# Patient Record
Sex: MALE | Race: WHITE | Employment: FULL TIME | URBAN - NONMETROPOLITAN AREA
[De-identification: names, ages, dates, MRNs, and addresses within clinical notes are randomized per-mention and may not be internally consistent; named-entity substitution may affect disease eponyms.]

---

## 2022-01-22 ENCOUNTER — HOSPITAL ENCOUNTER (EMERGENCY)
Age: 47
Discharge: HOME | End: 2022-01-22
Attending: EMERGENCY MEDICINE
Payer: COMMERCIAL

## 2022-01-22 ENCOUNTER — APPOINTMENT (OUTPATIENT)
Dept: GENERAL RADIOLOGY | Age: 47
End: 2022-01-22
Payer: COMMERCIAL

## 2022-01-22 VITALS
HEART RATE: 66 BPM | RESPIRATION RATE: 14 BRPM | BODY MASS INDEX: 28.63 KG/M2 | TEMPERATURE: 98.6 F | DIASTOLIC BLOOD PRESSURE: 92 MMHG | HEIGHT: 70 IN | SYSTOLIC BLOOD PRESSURE: 139 MMHG | WEIGHT: 200 LBS | OXYGEN SATURATION: 99 %

## 2022-01-22 DIAGNOSIS — F41.1 ANXIETY STATE: ICD-10-CM

## 2022-01-22 DIAGNOSIS — R11.2 NAUSEA AND VOMITING, INTRACTABILITY OF VOMITING NOT SPECIFIED, UNSPECIFIED VOMITING TYPE: Primary | ICD-10-CM

## 2022-01-22 PROCEDURE — 6370000000 HC RX 637 (ALT 250 FOR IP): Performed by: EMERGENCY MEDICINE

## 2022-01-22 PROCEDURE — 99285 EMERGENCY DEPT VISIT HI MDM: CPT

## 2022-01-22 PROCEDURE — 71046 X-RAY EXAM CHEST 2 VIEWS: CPT

## 2022-01-22 RX ORDER — METOCLOPRAMIDE 10 MG/1
10 TABLET ORAL ONCE
Status: COMPLETED | OUTPATIENT
Start: 2022-01-22 | End: 2022-01-22

## 2022-01-22 RX ORDER — FLUTICASONE PROPIONATE 50 MCG
2 SPRAY, SUSPENSION (ML) NASAL DAILY
Qty: 16 G | Refills: 0 | Status: SHIPPED | OUTPATIENT
Start: 2022-01-22

## 2022-01-22 RX ORDER — METOCLOPRAMIDE HYDROCHLORIDE 5 MG/5ML
10 SOLUTION ORAL
Qty: 280 ML | Refills: 0 | Status: SHIPPED | OUTPATIENT
Start: 2022-01-22 | End: 2022-01-29

## 2022-01-22 RX ORDER — LISINOPRIL 10 MG/1
TABLET ORAL
COMMUNITY
Start: 2022-01-01

## 2022-01-22 RX ORDER — HYDROXYZINE PAMOATE 25 MG/1
25 CAPSULE ORAL ONCE
Status: COMPLETED | OUTPATIENT
Start: 2022-01-22 | End: 2022-01-22

## 2022-01-22 RX ORDER — HYDROXYZINE HCL 10 MG/5 ML
10 SOLUTION, ORAL ORAL 3 TIMES DAILY PRN
Qty: 75 ML | Refills: 0 | Status: SHIPPED | OUTPATIENT
Start: 2022-01-22 | End: 2022-01-27

## 2022-01-22 RX ORDER — PANTOPRAZOLE SODIUM 40 MG/1
40 TABLET, DELAYED RELEASE ORAL ONCE
Status: DISCONTINUED | OUTPATIENT
Start: 2022-01-22 | End: 2022-01-22 | Stop reason: HOSPADM

## 2022-01-22 RX ORDER — NALTREXONE 380 MG
KIT INTRAMUSCULAR
COMMUNITY
Start: 2021-12-15

## 2022-01-22 RX ORDER — ATORVASTATIN CALCIUM 20 MG/1
TABLET, FILM COATED ORAL
COMMUNITY
Start: 2022-01-07

## 2022-01-22 RX ADMIN — HYDROXYZINE PAMOATE 25 MG: 25 CAPSULE ORAL at 13:05

## 2022-01-22 RX ADMIN — METOCLOPRAMIDE 10 MG: 10 TABLET ORAL at 13:05

## 2022-01-22 ASSESSMENT — ENCOUNTER SYMPTOMS
ABDOMINAL PAIN: 0
SHORTNESS OF BREATH: 0
BLOOD IN STOOL: 0
VOMITING: 1
CONSTIPATION: 0
COLOR CHANGE: 0

## 2022-01-22 NOTE — ED TRIAGE NOTES
Pt reports last drink was Wednesday 1/12/2022, pt reports going to [facility] for detox on Tuesday 1/25/2022.

## 2022-01-22 NOTE — ED PROVIDER NOTES
Emergency Department Provider Note  Location: [facility]ending [facility] to [facility] EMERGENCY DEPARTMENT  1/22/2022     Patient Identification  Richi Walker is a 40 y.o. male    Chief Complaint  Other (Has difficulty swallowing solid food x 4 days, states he is 9 days positive with COVID.)      Mode of Arrival  private car    HPI  (History provided by patient)  This is a 40 y.o. male with a PMH significant for HTN, HLD, alcoholism presented today for difficulty swallowing food. Patient said had URI symptoms since last Wednesday and he was diagnosed with COVID last Friday. 4 days ago, he started having trouble swallowing such that whenever he thinks of food, he starts to feel really anxious. Then when he tries to eat, he can keep down the first few bites but then after that, he would start vomiting up the food. He reports normal BM, including this morning. Denies bloody stool. He denies prior history of GERD or esophageal stricture. ROS  Review of Systems   Constitutional: Negative for chills and fever. HENT: Positive for congestion. Respiratory: Negative for shortness of breath. Cardiovascular: Negative for chest pain. Gastrointestinal: Positive for vomiting. Negative for abdominal pain, blood in stool and constipation. Genitourinary: Negative for difficulty urinating. Skin: Negative for color change. Psychiatric/Behavioral: The patient is nervous/anxious. I have reviewed the following nursing documentation:  Allergies: No Known Allergies    Past medical history:  has a past medical history of Alcoholism ([address] Hyperlipidemia, and Hypertension. Past surgical history:  has a past surgical history that includes Inguinal hernia repair (Bilateral) and Appendectomy. Home medications:   Prior to Admission medications    Medication Sig Start Date End Date Taking?  Authorizing Provider   lisinopril (PRINIVIL;ZESTRIL) 10 MG tablet  1/1/22  Yes Historical Provider, MD   atorvastatin (LIPITOR) 20 MG tablet  1/7/22  Yes Historical Provider, MD   ibuprofen (ADVIL;MOTRIN) 800 MG tablet Take 1 tablet by mouth every 8 hours as needed for Pain 2/19/18  Yes Kevin Almaguer PA-C   VIVITROL 380 MG injection  12/15/21   Historical Provider, MD       Social history:  reports that he has been smoking cigarettes. His smokeless tobacco use includes chew. He reports current alcohol use of about 49.0 standard drinks of alcohol per week. He reports that he does not use drugs. Family history:    Family History   Problem Relation Age of Onset    Asthma Mother        Exam  ED Triage Vitals [01/22/22 1146]   BP Temp Temp Source Pulse Resp SpO2 Height Weight   (!) 155/103 98.6 °F (37 °C) Oral 70 16 98 % 5' 10\" (1.778 m) 200 lb (90.7 kg)   Physical Exam  Vitals and nursing note reviewed. Constitutional:       General: He is not in acute distress. Appearance: He is well-developed. He is not diaphoretic. Comments: Patient sounds congested but in no acute distress. No cough observed. HENT:      Head: Normocephalic and atraumatic. Eyes:      General: No scleral icterus. Right eye: No discharge. Left eye: No discharge. Conjunctiva/sclera: Conjunctivae normal.   Neck:      Trachea: No tracheal deviation. Cardiovascular:      Rate and Rhythm: Normal rate and regular rhythm. Heart sounds: Normal heart sounds. No murmur heard. Pulmonary:      Effort: Pulmonary effort is normal. No respiratory distress. Breath sounds: Normal breath sounds. No stridor. No wheezing. Abdominal:      General: There is no distension. Palpations: Abdomen is soft. Tenderness: There is no abdominal tenderness. There is no guarding or rebound. Musculoskeletal:         General: No deformity. Cervical back: Neck supple. Right lower leg: No edema. Left lower leg: No edema. Skin:     General: Skin is warm and dry. Findings: No erythema or rash.    Neurological:      Mental Status: He is alert and oriented to person, place, and time. Cranial Nerves: No dysarthria or facial asymmetry. Motor: No weakness or tremor. Coordination: Coordination normal.   Psychiatric:         Mood and Affect: Mood is anxious. Behavior: Behavior normal.             MDM/ED Course  ED Medication Orders (From admission, onward)    Start Ordered     Status Ordering Provider    01/22/22 1315 01/22/22 1254  metoclopramide (REGLAN) tablet 10 mg  ONCE         Last MAR action: Given - by Pranav Maguire on 01/22/22 at [address] [place] [address]    01/22/22 1315 01/22/22 1254  pantoprazole (PROTONIX) tablet 40 mg  ONCE         Last MAR action: Not Given - by Pranav Maguire on 01/22/22 at [facility]    01/22/22 1315 01/22/22 1255  hydrOXYzine (VISTARIL) capsule 25 mg  ONCE         Last MAR action: Given - by Pranav Maguire on 01/22/22 at [address] [place] [address]         Radiology  XR CHEST (2 VW)    Result Date: 1/22/2022  EXAMINATION: TWO XRAY VIEWS OF THE CHEST 1/22/2022 12:57 pm COMPARISON: None available. HISTORY: ORDERING SYSTEM PROVIDED HISTORY: cough, difficulty swallowing TECHNOLOGIST PROVIDED HISTORY: Reason for exam:->cough, difficulty swallowing Reason for Exam: trouble swallowing, covid+ 1/14/22 FINDINGS: The lungs are clear. The cardiac silhouette is within normal limits. There is no pneumothorax or pleural effusion. 1.  No acute abnormality.          - Patient seen and evaluated in room 8.  40 y.o. male presented for anxiety whenever he eats and then starts to vomit after a few bites. This has been ongoing for 4 days. Patient did not volunteer this information but I later learned from his nurse that patient has been in an alcohol detox program and last drink was 1/12/22. Clinically, I have no concern for alcohol withdrawal at this time. He is not tachycardic or tremulous. Physical exam unremarkable.   - Patient was placed on telemetry during his/her ED stay and no malignant dysrhythmia observed. - Patient was given Atarax and Reglan in the ED. Upon reassessment, patient reported feeling slightly better. He tolerated p.o. challenge in the ED.    - Diagnostic studies reviewed. Chest x-ray did not show acute abnormality.  - I discussed the results with patient. Patient would really like something for anxiety so we agreed to Atarax. Discussed using Flonase as a nasal decongestant and the Reglan for his vomiting. He states he will also prefer liquid medication.  - Return precautions also discussed. patient verbalized understanding of care plan and agreed to follow-up with PCP as advised. I estimate there is LOW risk for ACUTE CORONARY SYNDROME, SEVERE COPD/ASTHMA EXACERBATION WITH HYPOXIA, ACUTE EXACERBATION OF CONGESTIVE HEART FAILURE, PERICARDIAL TAMPONADE, PNEUMONIA WITH HYPOXIA, PNEUMOTHORAX, PULMONARY EMBOLISM, SEPSIS, and THORACIC DISSECTION,  thus I consider the discharge disposition reasonable. Mallika Calderon and I have discussed the diagnosis and risks, and we agree with discharging home to follow-up with their PCP. We also discussed returning to the Emergency Department immediately if new or worsening symptoms occur. We have discussed the symptoms which are most concerning (e.g., bloody sputum, fever, worsening pain or shortness of breath, vomiting) that necessitate immediate return. Clinical Impression:  1. Nausea and vomiting, intractability of vomiting not specified, unspecified vomiting type    2. Anxiety state          Disposition:  Discharge to home in good condition. Blood pressure (!) 139/92, pulse 66, temperature 98.6 °F (37 °C), temperature source Oral, resp. rate 14, height 5' 10\" (1.778 m), weight 200 lb (90.7 kg), SpO2 99 %. Patient was given scripts for the following medications. I counseled patient how to take these medications.    Discharge Medication List as of 1/22/2022  2:27 PM      START taking these medications    Details   hydrOXYzine (ATARAX) 10 MG/5ML syrup Take 5 mLs by mouth 3 times daily as needed for Anxiety, Disp-75 mL, R-0Print      metoclopramide (REGLAN) 5 MG/5ML solution Take 10 mLs by mouth 4 times daily (before meals and nightly) for 7 days, Disp-280 mL, R-0Print      fluticasone (FLONASE) 50 MCG/ACT nasal spray 2 sprays by Each Nostril route daily, Disp-16 g, R-0Print             Disposition referral (if applicable):  Kar Biggs PA-C  [address] [facility]  [phone]    Schedule an appointment as soon as possible for a visit in 3 days          This chart was generated in part by using Dragon Dictation system and may contain errors related to that system including errors in grammar, punctuation, and spelling, as well as words and phrases that may be inappropriate. If there are any questions or concerns please feel free to contact the dictating provider for clarification.      Eryn Trevino MD  [facility] Oley Dakin, MD  01/22/22 0142

## 2022-01-22 NOTE — ED TRIAGE NOTES
Pt reports since Tuesday has been having trouble swallowing whole foods but ok taking liquids. Denies this happening in the past. States he feels he has to constantly chew his foods to get them down. Denies feeling like \"his throat is closing up\". Feels anxious and feels he is having a panic attack\" but denies dx. Pt reports taking mucinex on Tuesday and Wednesday and lisinopril for the last three months, denies any new medications.

## 2022-02-06 ENCOUNTER — HOSPITAL ENCOUNTER (OUTPATIENT)
Age: 47
Discharge: HOME | End: 2022-02-06
Payer: COMMERCIAL

## 2022-02-06 ENCOUNTER — HOSPITAL ENCOUNTER (OUTPATIENT)
Dept: GENERAL RADIOLOGY | Age: 47
Discharge: HOME | End: 2022-02-06
Payer: COMMERCIAL

## 2022-02-06 DIAGNOSIS — M79.672 LEFT FOOT PAIN: ICD-10-CM

## 2022-02-06 PROCEDURE — 73630 X-RAY EXAM OF FOOT: CPT

## 2022-07-01 ENCOUNTER — HOSPITAL ENCOUNTER (OUTPATIENT)
Dept: ULTRASOUND IMAGING | Age: 47
Discharge: HOME | End: 2022-07-01
Payer: COMMERCIAL

## 2022-07-01 DIAGNOSIS — R14.0 ABDOMINAL DISTENTION: ICD-10-CM

## 2022-07-01 DIAGNOSIS — R79.89 ELEVATED LFTS: ICD-10-CM

## 2022-07-01 PROCEDURE — 76705 ECHO EXAM OF ABDOMEN: CPT

## 2022-07-21 ENCOUNTER — HOSPITAL ENCOUNTER (EMERGENCY)
Age: 47
Discharge: HOME | End: 2022-07-21
Attending: STUDENT IN AN ORGANIZED HEALTH CARE EDUCATION/TRAINING PROGRAM
Payer: COMMERCIAL

## 2022-07-21 ENCOUNTER — HOSPITAL ENCOUNTER (OUTPATIENT)
Age: 47
Discharge: HOME | End: 2022-07-21

## 2022-07-21 ENCOUNTER — APPOINTMENT (OUTPATIENT)
Dept: GENERAL RADIOLOGY | Age: 47
End: 2022-07-21
Payer: COMMERCIAL

## 2022-07-21 VITALS
DIASTOLIC BLOOD PRESSURE: 99 MMHG | TEMPERATURE: 98.2 F | HEIGHT: 69 IN | RESPIRATION RATE: 20 BRPM | OXYGEN SATURATION: 100 % | HEART RATE: 59 BPM | SYSTOLIC BLOOD PRESSURE: 149 MMHG | BODY MASS INDEX: 28.88 KG/M2 | WEIGHT: 195 LBS

## 2022-07-21 DIAGNOSIS — R42 LIGHTHEADEDNESS: Primary | ICD-10-CM

## 2022-07-21 LAB
ANION GAP SERPL CALCULATED.3IONS-SCNC: 9 MMOL/L (ref 3–16)
BASOPHILS ABSOLUTE: 0 K/UL (ref 0–0.2)
BASOPHILS RELATIVE PERCENT: 0.8 %
BUN BLDV-MCNC: 11 MG/DL (ref 7–20)
CALCIUM SERPL-MCNC: 9.4 MG/DL (ref 8.3–10.6)
CHLORIDE BLD-SCNC: 107 MMOL/L (ref 99–110)
CO2: 23 MMOL/L (ref 21–32)
CREAT SERPL-MCNC: 1 MG/DL (ref 0.9–1.3)
EKG ATRIAL RATE: 76 BPM
EKG DIAGNOSIS: NORMAL
EKG P AXIS: 20 DEGREES
EKG P-R INTERVAL: 176 MS
EKG Q-T INTERVAL: 382 MS
EKG QRS DURATION: 90 MS
EKG QTC CALCULATION (BAZETT): 429 MS
EKG R AXIS: -7 DEGREES
EKG T AXIS: 42 DEGREES
EKG VENTRICULAR RATE: 76 BPM
EOSINOPHILS ABSOLUTE: 0.1 K/UL (ref 0–0.6)
EOSINOPHILS RELATIVE PERCENT: 2.5 %
GFR AFRICAN AMERICAN: >60
GFR NON-AFRICAN AMERICAN: >60
GLUCOSE BLD-MCNC: 106 MG/DL (ref 70–99)
GLUCOSE BLD-MCNC: 114 MG/DL (ref 70–99)
HCT VFR BLD CALC: 39.2 % (ref 40.5–52.5)
HEMOGLOBIN: 13.5 G/DL (ref 13.5–17.5)
LYMPHOCYTES ABSOLUTE: 1.3 K/UL (ref 1–5.1)
LYMPHOCYTES RELATIVE PERCENT: 24.3 %
MCH RBC QN AUTO: 31.9 PG (ref 26–34)
MCHC RBC AUTO-ENTMCNC: 34.5 G/DL (ref 31–36)
MCV RBC AUTO: 92.5 FL (ref 80–100)
MONOCYTES ABSOLUTE: 0.3 K/UL (ref 0–1.3)
MONOCYTES RELATIVE PERCENT: 6.2 %
NEUTROPHILS ABSOLUTE: 3.5 K/UL (ref 1.7–7.7)
NEUTROPHILS RELATIVE PERCENT: 66.2 %
PDW BLD-RTO: 13.4 % (ref 12.4–15.4)
PERFORMED ON: ABNORMAL
PLATELET # BLD: 363 K/UL (ref 135–450)
PMV BLD AUTO: 7.5 FL (ref 5–10.5)
POTASSIUM SERPL-SCNC: 4.4 MMOL/L (ref 3.5–5.1)
RBC # BLD: 4.23 M/UL (ref 4.2–5.9)
SODIUM BLD-SCNC: 139 MMOL/L (ref 136–145)
TROPONIN: <0.01 NG/ML
WBC # BLD: 5.3 K/UL (ref 4–11)

## 2022-07-21 PROCEDURE — 71045 X-RAY EXAM CHEST 1 VIEW: CPT

## 2022-07-21 PROCEDURE — 99285 EMERGENCY DEPT VISIT HI MDM: CPT

## 2022-07-21 PROCEDURE — 80048 BASIC METABOLIC PNL TOTAL CA: CPT

## 2022-07-21 PROCEDURE — 93005 ELECTROCARDIOGRAM TRACING: CPT | Performed by: STUDENT IN AN ORGANIZED HEALTH CARE EDUCATION/TRAINING PROGRAM

## 2022-07-21 PROCEDURE — 84484 ASSAY OF TROPONIN QUANT: CPT

## 2022-07-21 PROCEDURE — 93010 ELECTROCARDIOGRAM REPORT: CPT | Performed by: INTERNAL MEDICINE

## 2022-07-21 PROCEDURE — 85025 COMPLETE CBC W/AUTO DIFF WBC: CPT

## 2022-07-21 ASSESSMENT — PAIN - FUNCTIONAL ASSESSMENT: PAIN_FUNCTIONAL_ASSESSMENT: NONE - DENIES PAIN

## 2022-07-21 NOTE — DISCHARGE INSTRUCTIONS
Dental Emergency Referrals    [address] [facility] residents only)    [facility]  [address] [phone]   [facility].  [phone]   [address] [facility]  [address] [facility] [address] [address]  [phone]   [facility]  (entrance on [address] [facility]. [address] [facility]  [address]  [phone]   [facility] [address].  [phone]   [facility]  [address] [place] [address].  [phone]       [address] [facility]  [address] [facility].  [phone]   [facility]  [address]  [phone]     [facility]  [facility]. 2nd floor  [phone]   [facility]  [address] [phone] ([address] [facility])  [place]: [address]: [address]  [phone]   [address] [facility]/ [address]  [phone]     [facility]  [address] [facility] [facility] [address]  [phone]   [address]  [address] [facility].  Oral Surgery Dept: [phone]  Dental Clinic: [address] [facility]  [phone]     [address] [place] [address] Drive [phone]   [facility]   [address] [place], [address]  [place] : [address] : [phone]     [facility]  [address] [facility] [address]    Other [address] in the area    [address] [facility] [place] (Dental Urgent Care)  [facility]  (Across from [place])  [facility], [address]  [phone]?       Pediatric Only Dentists    [address] Floor   Up to age 21  [address]  Up to age 24  [place]: [facility]   [phone] or [address] [facility] [address]: [address] [place] [address]  Up to age 14  [address] ([phone]

## 2022-07-21 NOTE — ED NOTES
Dr. Mario Núñez at bedside. Writer informed her that there is possible concern for stroke after speaking with patient, he states he has blurred vision upon further discussion of symptoms in addition to dizziness and difficulty concentrating.      Kaela Coffman RN  07/21/22 5247

## 2022-07-21 NOTE — ED NOTES
Discharge instructions reviewed with patient and. Patient verbalized understanding. All home medications have been reviewed, questions answered and patient voiced understanding. Given prescriptions, discharge instructions, and appointment times.       Kesha Tejada RN  07/21/22 8783

## 2022-07-21 NOTE — PROGRESS NOTES
Asked to evaluate patient in outpatient lobby as patient was not feeling right. Pt states he is feeling like his heart is racing and he is having trouble focusing. Pt also states he is having trouble speaking what he is thinking. B/P 178/101, HR 70. FSBS 106, SAO2 100%. Recommend patient be evaluated in the ER.  Patient to ER 9 , report to [employer]

## 2022-07-21 NOTE — ED PROVIDER NOTES
[address] ED      CHIEF COMPLAINT  Palpitations (Patient states he was on his way to [place] when he started to feel dizzy and felt palpitations. He remembered he was due for bloodwork so he stopped here and clinical sent him to the ER. )     HISTORY OF PRESENT ILLNESS  Shreyas Ernst is a 56 y.o. male  who presents to the ED complaining of lightheadedness, \"feeling off. \"  Patient states that he was driving up from [facility] to [place] and started to feel strange, and then remembered that he was supposed to come here to get some blood work done anyway so decided to check himself into the ER. He states that he initially had some blurred vision when symptoms were occurring, he states \"I think it is my nerves. \"  He states that since then, the blurred vision has resolved. He is currently feeling well and is no longer having any symptoms. Denies chest pain or shortness of breath, lightheadedness currently, cough, abdominal pain, or any other complaints or concerns. No other complaints, modifying factors or associated symptoms. I have reviewed the following from the nursing documentation. Past Medical History:   Diagnosis Date    Alcoholism ([address]     Hyperlipidemia     Hypertension      Past Surgical History:   Procedure Laterality Date    APPENDECTOMY      INGUINAL HERNIA REPAIR Bilateral      Family History   Problem Relation Age of Onset    Asthma Mother      Social History     Socioeconomic History    Marital status:      Spouse name: Not on file    Number of children: Not on file    Years of education: Not on file    Highest education level: Not on file   Occupational History    Not on file   Tobacco Use    Smoking status: Former     Types: Cigarettes    Smokeless tobacco: Current     Types: Chew   Vaping Use    Vaping Use: Never used   Substance and Sexual Activity    Alcohol use:  Yes     Alcohol/week: 49.0 standard drinks     Types: 49 Cans of beer per week    Drug use: No    Sexual activity: Yes     Partners: Female   Other Topics Concern    Not on file   Social History Narrative    Not on file     Social Determinants of Health     Financial Resource Strain: Not on file   Food Insecurity: Not on file   Transportation Needs: Not on file   Physical Activity: Not on file   Stress: Not on file   Social Connections: Not on file   Intimate Partner Violence: Not on file   Housing Stability: Not on file     No current facility-administered medications for this encounter. Current Outpatient Medications   Medication Sig Dispense Refill    lisinopril (PRINIVIL;ZESTRIL) 10 MG tablet       atorvastatin (LIPITOR) 20 MG tablet       VIVITROL 380 MG injection  (Patient not taking: Reported on 7/21/2022)      metoclopramide (REGLAN) 5 MG/5ML solution Take 10 mLs by mouth 4 times daily (before meals and nightly) for 7 days 280 mL 0    fluticasone (FLONASE) 50 MCG/ACT nasal spray 2 sprays by Each Nostril route daily 16 g 0    ibuprofen (ADVIL;MOTRIN) 800 MG tablet Take 1 tablet by mouth every 8 hours as needed for Pain 30 tablet 0     No Known Allergies    REVIEW OF SYSTEMS  10 systems reviewed, pertinent positives per HPI otherwise noted to be negative. PHYSICAL EXAM  BP (!) 149/99   Pulse 59   Temp 98.2 °F (36.8 °C) (Oral)   Resp 20   Ht 5' 9\" (1.753 m)   Wt 195 lb (88.5 kg)   SpO2 100%   BMI 28.80 kg/m²    GENERAL APPEARANCE: Awake and alert. Cooperative. No acute distress. HENT: Normocephalic. Atraumatic. Mucous membranes are moist.  NECK: Supple. EYES: PERRL. EOM's grossly intact. HEART/CHEST: RRR. No murmurs. LUNGS: Respirations unlabored. CTAB. Good air exchange. Speaking comfortably in full sentences. ABDOMEN: No tenderness. Soft. Non-distended. No masses. No organomegaly. No guarding or rebound. MUSCULOSKELETAL: No extremity edema. Compartments soft. No deformity. No tenderness in the extremities. All extremities neurovascularly intact. SKIN: Warm and dry. No acute rashes. NEUROLOGICAL: Alert and oriented. CN's 2-12 intact. No gross facial drooping. Strength 5/5, sensation intact. Gait normal.  PSYCHIATRIC: Appears anxious    LABS  I have reviewed all labs for this visit. Results for orders placed or performed during the hospital encounter of 84/50/72   Basic Metabolic Panel   Result Value Ref Range    Sodium 139 136 - 145 mmol/L    Potassium 4.4 3.5 - 5.1 mmol/L    Chloride 107 99 - 110 mmol/L    CO2 23 21 - 32 mmol/L    Anion Gap 9 3 - 16    Glucose 114 (H) 70 - 99 mg/dL    BUN 11 7 - 20 mg/dL    Creatinine 1.0 0.9 - 1.3 mg/dL    GFR Non-African American >60 >60    GFR African American >60 >60    Calcium 9.4 8.3 - 10.6 mg/dL   Troponin   Result Value Ref Range    Troponin <0.01 <0.01 ng/mL   CBC with Auto Differential   Result Value Ref Range    WBC 5.3 4.0 - 11.0 K/uL    RBC 4.23 4.20 - 5.90 M/uL    Hemoglobin 13.5 13.5 - 17.5 g/dL    Hematocrit 39.2 (L) 40.5 - 52.5 %    MCV 92.5 80.0 - 100.0 fL    MCH 31.9 26.0 - 34.0 pg    MCHC 34.5 31.0 - 36.0 g/dL    RDW 13.4 12.4 - 15.4 %    Platelets 269 526 - 931 K/uL    MPV 7.5 5.0 - 10.5 fL    Neutrophils % 66.2 %    Lymphocytes % 24.3 %    Monocytes % 6.2 %    Eosinophils % 2.5 %    Basophils % 0.8 %    Neutrophils Absolute 3.5 1.7 - 7.7 K/uL    Lymphocytes Absolute 1.3 1.0 - 5.1 K/uL    Monocytes Absolute 0.3 0.0 - 1.3 K/uL    Eosinophils Absolute 0.1 0.0 - 0.6 K/uL    Basophils Absolute 0.0 0.0 - 0.2 K/uL   EKG 12 Lead   Result Value Ref Range    Ventricular Rate 76 BPM    Atrial Rate 76 BPM    P-R Interval 176 ms    QRS Duration 90 ms    Q-T Interval 382 ms    QTc Calculation (Bazett) 429 ms    P Axis 20 degrees    R Axis -7 degrees    T Axis 42 degrees    Diagnosis       Normal sinus rhythmNormal ECGNo previous ECGs available       ECG  The Ekg interpreted by me shows  normal sinus rhythm with a rate of 76  Axis is   Left axis deviation  QTc is  normal  Intervals and Durations are unremarkable.       ST Segments: normal  No previous available for comparison    RADIOLOGY  XR CHEST PORTABLE   Final Result   No acute process. ED COURSE/MDM  Patient seen and evaluated. Old records reviewed. Labs and imaging reviewed and results discussed with patient. Patient is a 81-year-old male, presenting with concerns for blurred vision, lightheadedness, anxiety that had resolved prior to evaluation. Full HPI as detailed above. Upon arrival in the ED, vitals remarkable for hypertension which improved without treatment throughout course of the patient's stay. Otherwise reassuring. Patient is resting comfortably and is in no acute distress. He states that his symptoms have since resolved and he is feeling much more relaxed. His neurologic exam is normal.  EKG without evidence of acute ischemia or dysrhythmias. Troponin is negative, labs are performed and are overall reassuring. Chest x-ray without acute concerning findings. Patient was reassessed and continued to feel well. He remained asymptomatic. Feel that symptoms are possibly related to anxiety. No indication for hospitalization at this point in time. Findings were discussed and the patient is comfortable in agreement with plan of care, he did state that he has very poor teeth and feels like he needs to get them taking care of and he was given a referral for dental clinics in the area. He is given return precautions for the ED. He is comfortable in agreement with plan of care and was discharged    I, Dr. Garnell Harada, MD, am the primary clinician of record. Is this patient to be included in the SEP-1 Core Measure? No, infection is not suspected. During the patient's ED course, the patient was given:  Medications - No data to display     CLINICAL IMPRESSION  1. Lightheadedness        Blood pressure (!) 149/99, pulse 59, temperature 98.2 °F (36.8 °C), temperature source Oral, resp.  rate 20, height 5' 9\" (1.753 m), weight 195 lb (88.5 kg), SpO2 100 %. Monalisa Gonzalez was discharged to home in good condition. Patient was given scripts for the following medications. I counseled patient how to take these medications. Discharge Medication List as of 7/21/2022 11:25 AM          Follow-up with:  RIGOBERTO Decker [address] [place] [address]   [phone]    Schedule an appointment as soon as possible for a visit       [facility]  [address] [facility]  [phone]  Go to   If symptoms worsen    DISCLAIMER: This chart was created using Dragon dictation software. Efforts were made by me to ensure accuracy, however some errors may be present due to limitations of this technology and occasionally words are not transcribed correctly.        Amy Maradiaga MD  07/21/22 1797